# Patient Record
Sex: MALE | Race: WHITE | NOT HISPANIC OR LATINO | Employment: UNEMPLOYED | URBAN - NONMETROPOLITAN AREA
[De-identification: names, ages, dates, MRNs, and addresses within clinical notes are randomized per-mention and may not be internally consistent; named-entity substitution may affect disease eponyms.]

---
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
## 2024-08-08 ENCOUNTER — OFFICE VISIT (OUTPATIENT)
Dept: PRIMARY CARE CLINIC | Age: 19
End: 2024-08-08
Payer: COMMERCIAL

## 2024-08-08 VITALS
DIASTOLIC BLOOD PRESSURE: 74 MMHG | WEIGHT: 228 LBS | TEMPERATURE: 98.4 F | RESPIRATION RATE: 16 BRPM | HEART RATE: 84 BPM | OXYGEN SATURATION: 99 % | SYSTOLIC BLOOD PRESSURE: 122 MMHG

## 2024-08-08 DIAGNOSIS — R21 RASH AND NONSPECIFIC SKIN ERUPTION: ICD-10-CM

## 2024-08-08 DIAGNOSIS — L23.7 ALLERGIC DERMATITIS DUE TO POISON IVY: Primary | ICD-10-CM

## 2024-08-08 PROCEDURE — 96372 THER/PROPH/DIAG INJ SC/IM: CPT

## 2024-08-08 PROCEDURE — 99213 OFFICE O/P EST LOW 20 MIN: CPT

## 2024-08-08 RX ORDER — TRIAMCINOLONE ACETONIDE 1 MG/G
CREAM TOPICAL
Qty: 80 G | Refills: 0 | Status: SHIPPED | OUTPATIENT
Start: 2024-08-08

## 2024-08-08 RX ORDER — DEXAMETHASONE SODIUM PHOSPHATE 10 MG/ML
10 INJECTION INTRAMUSCULAR; INTRAVENOUS ONCE
Status: COMPLETED | OUTPATIENT
Start: 2024-08-08 | End: 2024-08-08

## 2024-08-08 RX ADMIN — DEXAMETHASONE SODIUM PHOSPHATE 10 MG: 10 INJECTION INTRAMUSCULAR; INTRAVENOUS at 16:46

## 2024-08-08 NOTE — PATIENT INSTRUCTIONS
- Dexamethasone injection administered during clinic visit.  - Steroid cream sent to the pharmacy, apply as directed.  - OTC Benadryl/Zyrtec as needed for itching.  - Avoid scratching as much as possible.  - May apply cool wet compresses to reduce itching.  - May soak in oatmeal baths and apply Calamine lotion or Aveeno for soothing effects.  - Leave the rash open to the air.   - Monitor for any swelling of throat/lips/tongue, development of SOA or severe N/V/D. If these occur, see treatment in the ER immediately.  - Return to the clinic or follow up with PCP if symptoms worsen or fail to improve.

## 2024-08-08 NOTE — PROGRESS NOTES
[facility]  [facility] [place] [address]  [address]  [place] [address]  Dept: [phone]  Dept Fax: [phone]  Loc: [phone]    Reese Tim is a 19 y.o. male who presents today for his medical conditions/complaints as noted below.  Reese Tim is c/o of Rash        HPI:     Reese Tim presents with complaints of erythematous rash, itchy in nature. Reports he hs been possibly exposed to poison ivy. OTC treatment includes anti-itch ream.    Denies any recent antibiotic or steroid administration.      Past Medical History:   Diagnosis Date    ADHD (attention deficit hyperactivity disorder)      Past Surgical History:   Procedure Laterality Date    ADENOIDECTOMY      CRANIAL LESION RESECTION      TONSILLECTOMY AND ADENOIDECTOMY  2006       History reviewed. No pertinent family history.    Social History     Tobacco Use    Smoking status: Never    Smokeless tobacco: Never   Substance Use Topics    Alcohol use: No      Current Outpatient Medications   Medication Sig Dispense Refill    triamcinolone (KENALOG) 0.1 % cream Apply topically 2 times daily. 80 g 0    EPINEPHrine (EPIPEN) 0.3 MG/0.3ML SHAYLA injection Inject 0.3 mLs into the muscle as needed Use as directed for allergic reaction       No current facility-administered medications for this visit.     Allergies   Allergen Reactions    Peanut-Containing Drug Products Anaphylaxis, Nausea And Vomiting and Rash    Penicillins Rash       Health Maintenance   Topic Date Due    Depression Screen  Never done    Varicella vaccine (1 of 2 - 13+ 2-dose series) Never done    HPV vaccine (1 - Male 3-dose series) Never done    HIV screen  Never done    Hepatitis C screen  Never done    Hepatitis B vaccine (1 of 3 - 19+ 3-dose series) Never done    DTaP/Tdap/Td vaccine (1 - Tdap) Never done    COVID-19 Vaccine (1 - 2023-24 season) Never done    Flu vaccine (1) Never done    Hepatitis A vaccine  Aged Out    Hib vaccine

## 2024-08-12 ASSESSMENT — ENCOUNTER SYMPTOMS: SORE THROAT: 0
